# Patient Record
Sex: MALE
[De-identification: names, ages, dates, MRNs, and addresses within clinical notes are randomized per-mention and may not be internally consistent; named-entity substitution may affect disease eponyms.]

---

## 2017-11-02 ENCOUNTER — TRANSCRIPTION ENCOUNTER (OUTPATIENT)
Age: 57
End: 2017-11-02

## 2017-11-03 ENCOUNTER — INPATIENT (INPATIENT)
Facility: HOSPITAL | Age: 57
LOS: 2 days | Discharge: PSYCHIATRIC OR SUBSTANCE USE FACILITY | DRG: 581 | End: 2017-11-06
Attending: SURGERY
Payer: COMMERCIAL

## 2017-11-03 VITALS
SYSTOLIC BLOOD PRESSURE: 131 MMHG | OXYGEN SATURATION: 100 % | RESPIRATION RATE: 13 BRPM | TEMPERATURE: 99 F | DIASTOLIC BLOOD PRESSURE: 68 MMHG | HEART RATE: 50 BPM

## 2017-11-03 DIAGNOSIS — S11.91XA LACERATION WITHOUT FOREIGN BODY OF UNSPECIFIED PART OF NECK, INITIAL ENCOUNTER: ICD-10-CM

## 2017-11-03 LAB
ABO RH CONFIRMATION: SIGNIFICANT CHANGE UP
ALBUMIN SERPL ELPH-MCNC: 3.5 G/DL — SIGNIFICANT CHANGE UP (ref 3.3–5.2)
ALP SERPL-CCNC: 58 U/L — SIGNIFICANT CHANGE UP (ref 40–120)
ALT FLD-CCNC: 21 U/L — SIGNIFICANT CHANGE UP
ANION GAP SERPL CALC-SCNC: 16 MMOL/L — SIGNIFICANT CHANGE UP (ref 5–17)
APTT BLD: 19.1 SEC — LOW (ref 27.5–37.4)
AST SERPL-CCNC: 19 U/L — SIGNIFICANT CHANGE UP
BILIRUB SERPL-MCNC: 0.5 MG/DL — SIGNIFICANT CHANGE UP (ref 0.4–2)
BLD GP AB SCN SERPL QL: SIGNIFICANT CHANGE UP
BLD GP AB SCN SERPL QL: SIGNIFICANT CHANGE UP
BUN SERPL-MCNC: 18 MG/DL — SIGNIFICANT CHANGE UP (ref 8–20)
CALCIUM SERPL-MCNC: 8.3 MG/DL — LOW (ref 8.6–10.2)
CHLORIDE SERPL-SCNC: 102 MMOL/L — SIGNIFICANT CHANGE UP (ref 98–107)
CO2 SERPL-SCNC: 21 MMOL/L — LOW (ref 22–29)
CREAT SERPL-MCNC: 1.09 MG/DL — SIGNIFICANT CHANGE UP (ref 0.5–1.3)
ETHANOL SERPL-MCNC: <10 MG/DL — SIGNIFICANT CHANGE UP
GLUCOSE SERPL-MCNC: 214 MG/DL — HIGH (ref 70–115)
HCT VFR BLD CALC: 35 % — LOW (ref 42–52)
HGB BLD-MCNC: 12.3 G/DL — LOW (ref 14–18)
INR BLD: 1.06 RATIO — SIGNIFICANT CHANGE UP (ref 0.88–1.16)
LYMPHOCYTES # BLD AUTO: 7 % — LOW (ref 20–55)
MAGNESIUM SERPL-MCNC: 2.3 MG/DL — SIGNIFICANT CHANGE UP (ref 1.6–2.6)
MCHC RBC-ENTMCNC: 32.7 PG — HIGH (ref 27–31)
MCHC RBC-ENTMCNC: 35.1 G/DL — SIGNIFICANT CHANGE UP (ref 32–36)
MCV RBC AUTO: 93.1 FL — SIGNIFICANT CHANGE UP (ref 80–94)
MONOCYTES NFR BLD AUTO: 4 % — SIGNIFICANT CHANGE UP (ref 3–10)
NEUTROPHILS NFR BLD AUTO: 86 % — HIGH (ref 37–73)
NEUTS BAND # BLD: 3 % — SIGNIFICANT CHANGE UP (ref 0–8)
PHOSPHATE SERPL-MCNC: 4.1 MG/DL — SIGNIFICANT CHANGE UP (ref 2.4–4.7)
PLAT MORPH BLD: NORMAL — SIGNIFICANT CHANGE UP
PLATELET # BLD AUTO: 318 K/UL — SIGNIFICANT CHANGE UP (ref 150–400)
POTASSIUM SERPL-MCNC: 4.6 MMOL/L — SIGNIFICANT CHANGE UP (ref 3.5–5.3)
POTASSIUM SERPL-SCNC: 4.6 MMOL/L — SIGNIFICANT CHANGE UP (ref 3.5–5.3)
PROT SERPL-MCNC: 5.9 G/DL — LOW (ref 6.6–8.7)
PROTHROM AB SERPL-ACNC: 11.7 SEC — SIGNIFICANT CHANGE UP (ref 9.8–12.7)
RBC # BLD: 3.76 M/UL — LOW (ref 4.6–6.2)
RBC # FLD: 12.2 % — SIGNIFICANT CHANGE UP (ref 11–15.6)
RBC BLD AUTO: NORMAL — SIGNIFICANT CHANGE UP
SODIUM SERPL-SCNC: 139 MMOL/L — SIGNIFICANT CHANGE UP (ref 135–145)
TYPE + AB SCN PNL BLD: SIGNIFICANT CHANGE UP
TYPE + AB SCN PNL BLD: SIGNIFICANT CHANGE UP
WBC # BLD: 25.7 K/UL — HIGH (ref 4.8–10.8)
WBC # FLD AUTO: 25.7 K/UL — HIGH (ref 4.8–10.8)

## 2017-11-03 PROCEDURE — 70498 CT ANGIOGRAPHY NECK: CPT | Mod: 26

## 2017-11-03 PROCEDURE — 31622 DX BRONCHOSCOPE/WASH: CPT

## 2017-11-03 PROCEDURE — 71010: CPT | Mod: 26

## 2017-11-03 PROCEDURE — 12001 RPR S/N/AX/GEN/TRNK 2.5CM/<: CPT | Mod: 59

## 2017-11-03 PROCEDURE — 99285 EMERGENCY DEPT VISIT HI MDM: CPT

## 2017-11-03 PROCEDURE — 43235 EGD DIAGNOSTIC BRUSH WASH: CPT

## 2017-11-03 PROCEDURE — 12042 INTMD RPR N-HF/GENIT2.6-7.5: CPT | Mod: 59

## 2017-11-03 RX ORDER — FENTANYL CITRATE 50 UG/ML
50 INJECTION INTRAVENOUS
Refills: 0 | Status: DISCONTINUED | OUTPATIENT
Start: 2017-11-03 | End: 2017-11-04

## 2017-11-03 RX ORDER — TETANUS AND DIPHTHERIA TOXOIDS ADSORBED 2; 2 [LF]/.5ML; [LF]/.5ML
0.5 INJECTION INTRAMUSCULAR ONCE
Refills: 0 | Status: DISCONTINUED | OUTPATIENT
Start: 2017-11-03 | End: 2017-11-04

## 2017-11-03 RX ORDER — FENTANYL CITRATE 50 UG/ML
25 INJECTION INTRAVENOUS
Refills: 0 | Status: DISCONTINUED | OUTPATIENT
Start: 2017-11-03 | End: 2017-11-04

## 2017-11-03 RX ORDER — SODIUM CHLORIDE 9 MG/ML
1000 INJECTION, SOLUTION INTRAVENOUS
Refills: 0 | Status: DISCONTINUED | OUTPATIENT
Start: 2017-11-03 | End: 2017-11-04

## 2017-11-03 RX ORDER — SODIUM CHLORIDE 9 MG/ML
1000 INJECTION, SOLUTION INTRAVENOUS
Refills: 0 | Status: DISCONTINUED | OUTPATIENT
Start: 2017-11-03 | End: 2017-11-05

## 2017-11-03 RX ORDER — ONDANSETRON 8 MG/1
4 TABLET, FILM COATED ORAL ONCE
Refills: 0 | Status: DISCONTINUED | OUTPATIENT
Start: 2017-11-03 | End: 2017-11-04

## 2017-11-03 RX ORDER — ONDANSETRON 8 MG/1
4 TABLET, FILM COATED ORAL EVERY 6 HOURS
Refills: 0 | Status: DISCONTINUED | OUTPATIENT
Start: 2017-11-03 | End: 2017-11-06

## 2017-11-03 RX ORDER — ENOXAPARIN SODIUM 100 MG/ML
30 INJECTION SUBCUTANEOUS EVERY 12 HOURS
Refills: 0 | Status: DISCONTINUED | OUTPATIENT
Start: 2017-11-03 | End: 2017-11-06

## 2017-11-03 RX ORDER — HYDROMORPHONE HYDROCHLORIDE 2 MG/ML
1 INJECTION INTRAMUSCULAR; INTRAVENOUS; SUBCUTANEOUS EVERY 4 HOURS
Refills: 0 | Status: DISCONTINUED | OUTPATIENT
Start: 2017-11-03 | End: 2017-11-05

## 2017-11-03 NOTE — ED PROVIDER NOTE - SKIN WOUND TYPE
10cm deep laceration across Left lateral aspect of neck. Laceration extends down to the trachea. No active bleeding/expanding hematoma./LACERATION(S)
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
## 2017-11-03 NOTE — BRIEF OPERATIVE NOTE - PROCEDURE
<<-----Click on this checkbox to enter Procedure Direct laryngoscopy with bronchoscopy and esophagoscopy in patient younger than 8 years of age  11/03/2017    Active  [id number]  Neck surgery  11/03/2017  Exploration of Zone 2 neck wound and complex closure of wound  Active  [id number]  Exploration and closure of wound of left upper extremity  11/03/2017  left antecubital fossa and left wrist  Active  [id number]
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
## 2017-11-03 NOTE — BRIEF OPERATIVE NOTE - COMMENTS
left upper extremity vascular checks and neck wound checks for vascular hard or soft signs to be monitored post op. one to one monitoring

## 2017-11-03 NOTE — H&P ADULT - NSHPPHYSICALEXAM_GEN_ALL_CORE
HEENT: Normocephalic, atraumatic, RODRI, EOM wnl, no otorrhea or hemotympanum b/l, no epistaxis or d/c b/l nares, no craniofacial bony pathology or tenderness b/l  Neck: 10 cm laceration to neck midline with no active bleeding  Cspine/thoracolumbrosacral spine: no gross bony pathology or tenderness to exam  Cardiovascular: S1S2 Present  Chest: no gross rib pathology or tenderness to exam. No sternal pathology or tenderness to exam. No crepitus, no ecchymosis, no hematoma. No penetrating thorcoabdominal trauma  Respiratory: no wheezes, rales or rhonchi to exam  ABD: soft, nontender, non distended, no rebound, no gaurding, no rigidity, no skin changes to exam. No pelvic instability to exam, no skin changes  Musculoskeletal: 5cm laceration to left antecubital with active bleeding, 3cm laceration to dorsal wrist  Skin: no lesions or rashes to exam
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
## 2017-11-04 LAB
ANION GAP SERPL CALC-SCNC: 9 MMOL/L — SIGNIFICANT CHANGE UP (ref 5–17)
BASOPHILS # BLD AUTO: 0 K/UL — SIGNIFICANT CHANGE UP (ref 0–0.2)
BASOPHILS # BLD AUTO: 0 K/UL — SIGNIFICANT CHANGE UP (ref 0–0.2)
BASOPHILS NFR BLD AUTO: 0.1 % — SIGNIFICANT CHANGE UP (ref 0–2)
BASOPHILS NFR BLD AUTO: 0.1 % — SIGNIFICANT CHANGE UP (ref 0–2)
BUN SERPL-MCNC: 12 MG/DL — SIGNIFICANT CHANGE UP (ref 8–20)
CALCIUM SERPL-MCNC: 8.4 MG/DL — LOW (ref 8.6–10.2)
CHLORIDE SERPL-SCNC: 107 MMOL/L — SIGNIFICANT CHANGE UP (ref 98–107)
CO2 SERPL-SCNC: 24 MMOL/L — SIGNIFICANT CHANGE UP (ref 22–29)
CREAT SERPL-MCNC: 0.86 MG/DL — SIGNIFICANT CHANGE UP (ref 0.5–1.3)
EOSINOPHIL # BLD AUTO: 0 K/UL — SIGNIFICANT CHANGE UP (ref 0–0.5)
EOSINOPHIL # BLD AUTO: 0 K/UL — SIGNIFICANT CHANGE UP (ref 0–0.5)
EOSINOPHIL NFR BLD AUTO: 0 % — SIGNIFICANT CHANGE UP (ref 0–5)
EOSINOPHIL NFR BLD AUTO: 0 % — SIGNIFICANT CHANGE UP (ref 0–5)
GLUCOSE SERPL-MCNC: 139 MG/DL — HIGH (ref 70–115)
HCT VFR BLD CALC: 32.8 % — LOW (ref 42–52)
HCT VFR BLD CALC: 36.6 % — LOW (ref 42–52)
HGB BLD-MCNC: 11.7 G/DL — LOW (ref 14–18)
HGB BLD-MCNC: 12.9 G/DL — LOW (ref 14–18)
LYMPHOCYTES # BLD AUTO: 0.7 K/UL — LOW (ref 1–4.8)
LYMPHOCYTES # BLD AUTO: 0.9 K/UL — LOW (ref 1–4.8)
LYMPHOCYTES # BLD AUTO: 3.7 % — LOW (ref 20–55)
LYMPHOCYTES # BLD AUTO: 6.4 % — LOW (ref 20–55)
MAGNESIUM SERPL-MCNC: 1.8 MG/DL — SIGNIFICANT CHANGE UP (ref 1.6–2.6)
MCHC RBC-ENTMCNC: 32 PG — HIGH (ref 27–31)
MCHC RBC-ENTMCNC: 32.1 PG — HIGH (ref 27–31)
MCHC RBC-ENTMCNC: 35.2 G/DL — SIGNIFICANT CHANGE UP (ref 32–36)
MCHC RBC-ENTMCNC: 35.7 G/DL — SIGNIFICANT CHANGE UP (ref 32–36)
MCV RBC AUTO: 90.1 FL — SIGNIFICANT CHANGE UP (ref 80–94)
MCV RBC AUTO: 90.8 FL — SIGNIFICANT CHANGE UP (ref 80–94)
MONOCYTES # BLD AUTO: 0.4 K/UL — SIGNIFICANT CHANGE UP (ref 0–0.8)
MONOCYTES # BLD AUTO: 0.5 K/UL — SIGNIFICANT CHANGE UP (ref 0–0.8)
MONOCYTES NFR BLD AUTO: 2.6 % — LOW (ref 3–10)
MONOCYTES NFR BLD AUTO: 2.8 % — LOW (ref 3–10)
NEUTROPHILS # BLD AUTO: 12.2 K/UL — HIGH (ref 1.8–8)
NEUTROPHILS # BLD AUTO: 16.9 K/UL — HIGH (ref 1.8–8)
NEUTROPHILS NFR BLD AUTO: 90.3 % — HIGH (ref 37–73)
NEUTROPHILS NFR BLD AUTO: 93 % — HIGH (ref 37–73)
PHOSPHATE SERPL-MCNC: 3.4 MG/DL — SIGNIFICANT CHANGE UP (ref 2.4–4.7)
PLAT MORPH BLD: NORMAL — SIGNIFICANT CHANGE UP
PLATELET # BLD AUTO: 164 K/UL — SIGNIFICANT CHANGE UP (ref 150–400)
PLATELET # BLD AUTO: 179 K/UL — SIGNIFICANT CHANGE UP (ref 150–400)
POTASSIUM SERPL-MCNC: 4.6 MMOL/L — SIGNIFICANT CHANGE UP (ref 3.5–5.3)
POTASSIUM SERPL-SCNC: 4.6 MMOL/L — SIGNIFICANT CHANGE UP (ref 3.5–5.3)
RBC # BLD: 3.64 M/UL — LOW (ref 4.6–6.2)
RBC # BLD: 4.03 M/UL — LOW (ref 4.6–6.2)
RBC # FLD: 12.9 % — SIGNIFICANT CHANGE UP (ref 11–15.6)
RBC # FLD: 12.9 % — SIGNIFICANT CHANGE UP (ref 11–15.6)
RBC BLD AUTO: NORMAL — SIGNIFICANT CHANGE UP
SODIUM SERPL-SCNC: 140 MMOL/L — SIGNIFICANT CHANGE UP (ref 135–145)
WBC # BLD: 13.4 K/UL — HIGH (ref 4.8–10.8)
WBC # BLD: 18.2 K/UL — HIGH (ref 4.8–10.8)
WBC # FLD AUTO: 13.4 K/UL — HIGH (ref 4.8–10.8)
WBC # FLD AUTO: 18.2 K/UL — HIGH (ref 4.8–10.8)

## 2017-11-04 PROCEDURE — 74220 X-RAY XM ESOPHAGUS 1CNTRST: CPT | Mod: 26

## 2017-11-04 RX ORDER — MAGNESIUM SULFATE 500 MG/ML
2 VIAL (ML) INJECTION ONCE
Refills: 0 | Status: COMPLETED | OUTPATIENT
Start: 2017-11-04 | End: 2017-11-04

## 2017-11-04 RX ORDER — TETANUS TOXOID, REDUCED DIPHTHERIA TOXOID AND ACELLULAR PERTUSSIS VACCINE, ADSORBED 5; 2.5; 8; 8; 2.5 [IU]/.5ML; [IU]/.5ML; UG/.5ML; UG/.5ML; UG/.5ML
0.5 SUSPENSION INTRAMUSCULAR ONCE
Refills: 0 | Status: COMPLETED | OUTPATIENT
Start: 2017-11-04 | End: 2017-11-05

## 2017-11-04 RX ADMIN — HYDROMORPHONE HYDROCHLORIDE 1 MILLIGRAM(S): 2 INJECTION INTRAMUSCULAR; INTRAVENOUS; SUBCUTANEOUS at 14:19

## 2017-11-04 RX ADMIN — HYDROMORPHONE HYDROCHLORIDE 1 MILLIGRAM(S): 2 INJECTION INTRAMUSCULAR; INTRAVENOUS; SUBCUTANEOUS at 08:52

## 2017-11-04 RX ADMIN — HYDROMORPHONE HYDROCHLORIDE 1 MILLIGRAM(S): 2 INJECTION INTRAMUSCULAR; INTRAVENOUS; SUBCUTANEOUS at 18:57

## 2017-11-04 RX ADMIN — ENOXAPARIN SODIUM 30 MILLIGRAM(S): 100 INJECTION SUBCUTANEOUS at 18:23

## 2017-11-04 RX ADMIN — HYDROMORPHONE HYDROCHLORIDE 1 MILLIGRAM(S): 2 INJECTION INTRAMUSCULAR; INTRAVENOUS; SUBCUTANEOUS at 09:13

## 2017-11-04 RX ADMIN — ENOXAPARIN SODIUM 30 MILLIGRAM(S): 100 INJECTION SUBCUTANEOUS at 05:14

## 2017-11-04 RX ADMIN — Medication 50 GRAM(S): at 18:21

## 2017-11-04 RX ADMIN — HYDROMORPHONE HYDROCHLORIDE 1 MILLIGRAM(S): 2 INJECTION INTRAMUSCULAR; INTRAVENOUS; SUBCUTANEOUS at 19:46

## 2017-11-04 RX ADMIN — SODIUM CHLORIDE 100 MILLILITER(S): 9 INJECTION, SOLUTION INTRAVENOUS at 01:00

## 2017-11-04 RX ADMIN — HYDROMORPHONE HYDROCHLORIDE 1 MILLIGRAM(S): 2 INJECTION INTRAMUSCULAR; INTRAVENOUS; SUBCUTANEOUS at 14:08

## 2017-11-04 RX ADMIN — HYDROMORPHONE HYDROCHLORIDE 1 MILLIGRAM(S): 2 INJECTION INTRAMUSCULAR; INTRAVENOUS; SUBCUTANEOUS at 02:24

## 2017-11-04 RX ADMIN — HYDROMORPHONE HYDROCHLORIDE 1 MILLIGRAM(S): 2 INJECTION INTRAMUSCULAR; INTRAVENOUS; SUBCUTANEOUS at 02:39
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
## 2017-11-04 NOTE — PROGRESS NOTE ADULT - SUBJECTIVE AND OBJECTIVE BOX
INTERVAL HPI/OVERNIGHT EVENTS:    SUBJECTIVE:  Pt seen and examined at bedside. No acute events overnight. Denies new onset hoarseness, or changes in voice. Denies numbness, tingling or weakness of the LUE. He remains on 1:1 with no current ideas of suicide.     MEDICATIONS  (STANDING):  diphtheria/tetanus/pertussis (acellular) Vaccine (ADAcel) 0.5 milliLiter(s) IntraMuscular once  enoxaparin Injectable 30 milliGRAM(s) SubCutaneous every 12 hours  lactated ringers. 1000 milliLiter(s) (100 mL/Hr) IV Continuous <Continuous>    MEDICATIONS  (PRN):  HYDROmorphone  Injectable 1 milliGRAM(s) IV Push every 4 hours PRN Severe Pain  ondansetron Injectable 4 milliGRAM(s) IV Push every 6 hours PRN Nausea      Vital Signs Last 24 Hrs  T(C): 36.7 (04 Nov 2017 16:50), Max: 37.6 (04 Nov 2017 10:18)  T(F): 98.1 (04 Nov 2017 16:50), Max: 99.6 (04 Nov 2017 10:18)  HR: 60 (04 Nov 2017 16:50) (50 - 95)  BP: 98/53 (04 Nov 2017 16:50) (98/53 - 153/48)  BP(mean): --  RR: 18 (04 Nov 2017 16:50) (11 - 21)  SpO2: 100% (04 Nov 2017 16:50) (95% - 100%)    PE  Gen: No acute distress  Neck: Wound is clean, dry and intact. Non edematous. DAKOTAH drain to neck has 20ml serosanguinous.  Pulm: CTABL.  CV: S1/S2  Abd: Soft, Nontender  Ext: wounds to LUE are clean, dry and intact.   Vasc: Radial pulse 2+ bilaterally  Neuro:  strength 5/5 bilateral. Moves all extremities CN nerves 2-12 intact.      I&O's Detail    03 Nov 2017 07:01  -  04 Nov 2017 07:00  --------------------------------------------------------  IN:    lactated ringers.: 500 mL    lactated ringers.: 700 mL  Total IN: 1200 mL    OUT:    Bulb: 5 mL    Voided: 1500 mL  Total OUT: 1505 mL    Total NET: -305 mL      04 Nov 2017 08:01  -  04 Nov 2017 20:04  --------------------------------------------------------  IN:  Total IN: 0 mL    OUT:    Bulb: 20 mL    Voided: 400 mL  Total OUT: 420 mL    Total NET: -420 mL          LABS:                        11.7   13.4  )-----------( 179      ( 04 Nov 2017 05:28 )             32.8     11-04    140  |  107  |  12.0  ----------------------------<  139<H>  4.6   |  24.0  |  0.86    Ca    8.4<L>      04 Nov 2017 05:27  Phos  3.4     11-04  Mg     1.8     11-04    TPro  5.9<L>  /  Alb  3.5  /  TBili  0.5  /  DBili  x   /  AST  19  /  ALT  21  /  AlkPhos  58  11-03    PT/INR - ( 03 Nov 2017 17:39 )   PT: 11.7 sec;   INR: 1.06 ratio         PTT - ( 03 Nov 2017 17:39 )  PTT:19.1 sec      RADIOLOGY & ADDITIONAL STUDIES:

## 2017-11-05 DIAGNOSIS — F31.9 BIPOLAR DISORDER, UNSPECIFIED: ICD-10-CM

## 2017-11-05 DIAGNOSIS — S11.91XA LACERATION WITHOUT FOREIGN BODY OF UNSPECIFIED PART OF NECK, INITIAL ENCOUNTER: ICD-10-CM

## 2017-11-05 DIAGNOSIS — F39 UNSPECIFIED MOOD [AFFECTIVE] DISORDER: ICD-10-CM

## 2017-11-05 LAB
ANION GAP SERPL CALC-SCNC: 8 MMOL/L — SIGNIFICANT CHANGE UP (ref 5–17)
BASOPHILS # BLD AUTO: 0 K/UL — SIGNIFICANT CHANGE UP (ref 0–0.2)
BASOPHILS NFR BLD AUTO: 0.5 % — SIGNIFICANT CHANGE UP (ref 0–2)
BUN SERPL-MCNC: 9 MG/DL — SIGNIFICANT CHANGE UP (ref 8–20)
CALCIUM SERPL-MCNC: 8.1 MG/DL — LOW (ref 8.6–10.2)
CHLORIDE SERPL-SCNC: 106 MMOL/L — SIGNIFICANT CHANGE UP (ref 98–107)
CO2 SERPL-SCNC: 30 MMOL/L — HIGH (ref 22–29)
CREAT SERPL-MCNC: 0.82 MG/DL — SIGNIFICANT CHANGE UP (ref 0.5–1.3)
EOSINOPHIL # BLD AUTO: 0.1 K/UL — SIGNIFICANT CHANGE UP (ref 0–0.5)
EOSINOPHIL NFR BLD AUTO: 1.1 % — SIGNIFICANT CHANGE UP (ref 0–5)
GLUCOSE SERPL-MCNC: 94 MG/DL — SIGNIFICANT CHANGE UP (ref 70–115)
HCT VFR BLD CALC: 29.8 % — LOW (ref 42–52)
HGB BLD-MCNC: 10.4 G/DL — LOW (ref 14–18)
LYMPHOCYTES # BLD AUTO: 2.2 K/UL — SIGNIFICANT CHANGE UP (ref 1–4.8)
LYMPHOCYTES # BLD AUTO: 25.5 % — SIGNIFICANT CHANGE UP (ref 20–55)
MAGNESIUM SERPL-MCNC: 2.2 MG/DL — SIGNIFICANT CHANGE UP (ref 1.6–2.6)
MCHC RBC-ENTMCNC: 32.1 PG — HIGH (ref 27–31)
MCHC RBC-ENTMCNC: 34.9 G/DL — SIGNIFICANT CHANGE UP (ref 32–36)
MCV RBC AUTO: 92 FL — SIGNIFICANT CHANGE UP (ref 80–94)
MONOCYTES # BLD AUTO: 0.8 K/UL — SIGNIFICANT CHANGE UP (ref 0–0.8)
MONOCYTES NFR BLD AUTO: 8.8 % — SIGNIFICANT CHANGE UP (ref 3–10)
NEUTROPHILS # BLD AUTO: 5.4 K/UL — SIGNIFICANT CHANGE UP (ref 1.8–8)
NEUTROPHILS NFR BLD AUTO: 63.9 % — SIGNIFICANT CHANGE UP (ref 37–73)
PHOSPHATE SERPL-MCNC: 1.9 MG/DL — LOW (ref 2.4–4.7)
PLATELET # BLD AUTO: 145 K/UL — LOW (ref 150–400)
POTASSIUM SERPL-MCNC: 3.5 MMOL/L — SIGNIFICANT CHANGE UP (ref 3.5–5.3)
POTASSIUM SERPL-SCNC: 3.5 MMOL/L — SIGNIFICANT CHANGE UP (ref 3.5–5.3)
RBC # BLD: 3.24 M/UL — LOW (ref 4.6–6.2)
RBC # FLD: 13 % — SIGNIFICANT CHANGE UP (ref 11–15.6)
SODIUM SERPL-SCNC: 144 MMOL/L — SIGNIFICANT CHANGE UP (ref 135–145)
WBC # BLD: 8.5 K/UL — SIGNIFICANT CHANGE UP (ref 4.8–10.8)
WBC # FLD AUTO: 8.5 K/UL — SIGNIFICANT CHANGE UP (ref 4.8–10.8)

## 2017-11-05 PROCEDURE — 99253 IP/OBS CNSLTJ NEW/EST LOW 45: CPT

## 2017-11-05 RX ORDER — ACETAMINOPHEN 500 MG
650 TABLET ORAL EVERY 6 HOURS
Refills: 0 | Status: DISCONTINUED | OUTPATIENT
Start: 2017-11-05 | End: 2017-11-06

## 2017-11-05 RX ORDER — POTASSIUM CHLORIDE 20 MEQ
40 PACKET (EA) ORAL ONCE
Refills: 0 | Status: COMPLETED | OUTPATIENT
Start: 2017-11-05 | End: 2017-11-05

## 2017-11-05 RX ORDER — QUETIAPINE FUMARATE 200 MG/1
50 TABLET, FILM COATED ORAL AT BEDTIME
Refills: 0 | Status: DISCONTINUED | OUTPATIENT
Start: 2017-11-05 | End: 2017-11-06

## 2017-11-05 RX ADMIN — QUETIAPINE FUMARATE 50 MILLIGRAM(S): 200 TABLET, FILM COATED ORAL at 21:13

## 2017-11-05 RX ADMIN — Medication 40 MILLIEQUIVALENT(S): at 14:30

## 2017-11-05 RX ADMIN — TETANUS TOXOID, REDUCED DIPHTHERIA TOXOID AND ACELLULAR PERTUSSIS VACCINE, ADSORBED 0.5 MILLILITER(S): 5; 2.5; 8; 8; 2.5 SUSPENSION INTRAMUSCULAR at 17:59

## 2017-11-05 RX ADMIN — Medication 650 MILLIGRAM(S): at 05:32

## 2017-11-05 RX ADMIN — ENOXAPARIN SODIUM 30 MILLIGRAM(S): 100 INJECTION SUBCUTANEOUS at 04:52

## 2017-11-05 RX ADMIN — Medication 650 MILLIGRAM(S): at 20:27

## 2017-11-05 RX ADMIN — Medication 650 MILLIGRAM(S): at 04:50

## 2017-11-05 RX ADMIN — Medication 63.75 MILLIMOLE(S): at 14:31

## 2017-11-05 RX ADMIN — Medication 650 MILLIGRAM(S): at 19:31

## 2017-11-05 RX ADMIN — ENOXAPARIN SODIUM 30 MILLIGRAM(S): 100 INJECTION SUBCUTANEOUS at 17:59
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
## 2017-11-05 NOTE — PROGRESS NOTE ADULT - PROBLEM SELECTOR PLAN 1
- Spoke with behavioral health service again this morning to confirm psych consult was rcvd - they stated they had the consult and patient would be seen. As of sunday afternoon, awaiting note with evaluation and recommendations  - Regular diet as tolerated, d/c'ed IVF  - If drain OP remains minimal and pt continues to tolerated regular diet - will d/c DAKOTAH drain tomorrow  - Continue pain control with current regimen, PRN  - Trauma team will continue to assess wounds daily and perform local wound care  - Encourage ambulation as tolerated  - DVT ppx with lovenox  - Incentive spirometer for pulmonary toileting - Spoke with behavioral health service again this morning to confirm psych consult was rcvd - they stated they had the consult and patient would be seen. Gave service the patient's information again. As of sunday afternoon, awaiting note with evaluation and recommendations  - Continue 1:1 constant observation  - Regular diet as tolerated, d/c'ed IVF  - If drain OP remains minimal and pt continues to tolerated regular diet - will d/c DAKOTAH drain tomorrow  - Continue pain control with current regimen, PRN  - Trauma team will continue to assess wounds daily and perform local wound care  - Encourage ambulation as tolerated  - DVT ppx with lovenox  - Incentive spirometer for pulmonary toileting - Psych input appreciated - dilaudid d/c'ed and started on Seroquel   - Per psych patient is likely to go to [facility] upon discharge - needs further psychatric assessment prior to d/c  - Continue 1:1 constant observation  - Regular diet as tolerated, d/c'ed IVF  - If drain OP remains minimal and pt continues to tolerated regular diet - will d/c DAKOTAH drain tomorrow  - Continue pain control with current regimen, PRN  - Trauma team will continue to assess wounds daily and perform local wound care  - Encourage ambulation as tolerated  - DVT ppx with lovenox  - Incentive spirometer for pulmonary toileting
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
## 2017-11-05 NOTE — PROGRESS NOTE ADULT - SUBJECTIVE AND OBJECTIVE BOX
HPI/OVERNIGHT EVENTS: Patient seen and examined at bedside this morning - late chart entry. Patient reported feeling well. He states pain is minimal and well controlled with medication. He denies feelings of difficulty breathing, difficulty swallowing, difficulty talking, or feelings of edema to neck. He was able to tolerate clear liquid diet this AM without difficulty, after esophagram from yesterday was normal. He expresses desire to speak with psychiatrist - reassured patient that service has been called and will be called again today to follow-up. He has been OOB ambulating and voiding independently. He denies feelings of numbness / tingling or oozing from wounds on RUE. Patient denies feelings of fever, chills, CP, or SOB.     MEDICATIONS  (STANDING):  diphtheria/tetanus/pertussis (acellular) Vaccine (ADAcel) 0.5 milliLiter(s) IntraMuscular once  enoxaparin Injectable 30 milliGRAM(s) SubCutaneous every 12 hours    MEDICATIONS  (PRN):  acetaminophen   Tablet. 650 milliGRAM(s) Oral every 6 hours PRN Mild Pain (1 - 3)  HYDROmorphone  Injectable 1 milliGRAM(s) IV Push every 4 hours PRN Severe Pain  ondansetron Injectable 4 milliGRAM(s) IV Push every 6 hours PRN Nausea      Vital Signs Last 24 Hrs  T(C): 36.7 (05 Nov 2017 15:28), Max: 36.7 (04 Nov 2017 16:50)  T(F): 98.1 (05 Nov 2017 15:28), Max: 98.1 (04 Nov 2017 16:50)  HR: 67 (05 Nov 2017 15:28) (60 - 67)  BP: 115/65 (05 Nov 2017 15:28) (91/53 - 115/65)  BP(mean): --  RR: 18 (05 Nov 2017 15:28) (17 - 19)  SpO2: 99% (05 Nov 2017 15:28) (98% - 100%)    Constitutional: patient resting comfortably in bed, on 1:1, appears calm with family at bedside, in no acute distress  HEENT: EOMI / PERRL b/l, nasal cannula in place  Neck: Neck dressing removed revealing horizontal laceration with no active drainage or surrounding erythema. Drain remains in place to right side of neck with minimal serosanguenous output. Drain stripped and sterile dressing replaced.   Respiratory: respirations are unlabored, no accessory muscle use, no conversational dyspnea  Cardiovascular: regular rate & rhythm  Gastrointestinal: abdomen soft, non-tender, non-distended, no rebound tenderness / guarding  Extremities: LUE with forearm and wrist wounds covered with steri-strips - no active bleeding. RUE remains neurovascularly intact  Neurological: GCS: 15 (4/5/6). A&O x 3; no gross sensory / motor / coordination deficits      I&O's Detail    04 Nov 2017 08:01  -  05 Nov 2017 07:00  --------------------------------------------------------  IN:    lactated ringers.: 1100 mL  Total IN: 1100 mL    OUT:    Bulb: 35 mL    Voided: 400 mL  Total OUT: 435 mL    Total NET: 665 mL          LABS:                        10.4   8.5   )-----------( 145      ( 05 Nov 2017 05:37 )             29.8     11-05    144  |  106  |  9.0  ----------------------------<  94  3.5   |  30.0<H>  |  0.82    Ca    8.1<L>      05 Nov 2017 05:38  Phos  1.9     11-05  Mg     2.2     11-05    TPro  5.9<L>  /  Alb  3.5  /  TBili  0.5  /  DBili  x   /  AST  19  /  ALT  21  /  AlkPhos  58  11-03    PT/INR - ( 03 Nov 2017 17:39 )   PT: 11.7 sec;   INR: 1.06 ratio         PTT - ( 03 Nov 2017 17:39 )  PTT:19.1 sec HPI/OVERNIGHT EVENTS: Patient seen and examined at bedside this morning - late chart entry. Patient reported feeling well. He states pain is minimal and well controlled with medication. He denies feelings of difficulty breathing, difficulty swallowing, difficulty talking, or feelings of neck swelling. He was able to tolerate clear liquid diet this AM without difficulty, after esophagram from yesterday was normal. He expresses desire to speak with psychiatrist - reassured patient that service has been called and will be called again today to follow-up. He has been OOB ambulating and voiding independently. He denies feelings of numbness / tingling or oozing from wounds on RUE. Patient denies feelings of fever, chills, CP, or SOB.     MEDICATIONS  (STANDING):  diphtheria/tetanus/pertussis (acellular) Vaccine (ADAcel) 0.5 milliLiter(s) IntraMuscular once  enoxaparin Injectable 30 milliGRAM(s) SubCutaneous every 12 hours    MEDICATIONS  (PRN):  acetaminophen   Tablet. 650 milliGRAM(s) Oral every 6 hours PRN Mild Pain (1 - 3)  HYDROmorphone  Injectable 1 milliGRAM(s) IV Push every 4 hours PRN Severe Pain  ondansetron Injectable 4 milliGRAM(s) IV Push every 6 hours PRN Nausea      Vital Signs Last 24 Hrs  T(C): 36.7 (05 Nov 2017 15:28), Max: 36.7 (04 Nov 2017 16:50)  T(F): 98.1 (05 Nov 2017 15:28), Max: 98.1 (04 Nov 2017 16:50)  HR: 67 (05 Nov 2017 15:28) (60 - 67)  BP: 115/65 (05 Nov 2017 15:28) (91/53 - 115/65)  BP(mean): --  RR: 18 (05 Nov 2017 15:28) (17 - 19)  SpO2: 99% (05 Nov 2017 15:28) (98% - 100%)    Constitutional: patient resting comfortably in bed, on 1:1, appears calm with family at bedside, in no acute distress  HEENT: EOMI / PERRL b/l, nasal cannula in place  Neck: Neck dressing removed revealing horizontal laceration with no active drainage or surrounding erythema. Drain remains in place to right side of neck with minimal serosanguenous output. Drain stripped and sterile dressing replaced.   Respiratory: respirations are unlabored, no accessory muscle use, no conversational dyspnea  Cardiovascular: regular rate & rhythm  Gastrointestinal: abdomen soft, non-tender, non-distended, no rebound tenderness / guarding  Extremities: LUE with forearm and wrist wounds covered with steri-strips - no active bleeding. RUE remains neurovascularly intact  Neurological: GCS: 15 (4/5/6). A&O x 3; no gross sensory / motor / coordination deficits      I&O's Detail    04 Nov 2017 08:01  -  05 Nov 2017 07:00  --------------------------------------------------------  IN:    lactated ringers.: 1100 mL  Total IN: 1100 mL    OUT:    Bulb: 35 mL    Voided: 400 mL  Total OUT: 435 mL    Total NET: 665 mL          LABS:                        10.4   8.5   )-----------( 145      ( 05 Nov 2017 05:37 )             29.8     11-05    144  |  106  |  9.0  ----------------------------<  94  3.5   |  30.0<H>  |  0.82    Ca    8.1<L>      05 Nov 2017 05:38  Phos  1.9     11-05  Mg     2.2     11-05    TPro  5.9<L>  /  Alb  3.5  /  TBili  0.5  /  DBili  x   /  AST  19  /  ALT  21  /  AlkPhos  58  11-03    PT/INR - ( 03 Nov 2017 17:39 )   PT: 11.7 sec;   INR: 1.06 ratio         PTT - ( 03 Nov 2017 17:39 )  PTT:19.1 sec

## 2017-11-06 ENCOUNTER — INPATIENT (INPATIENT)
Facility: HOSPITAL | Age: 57
LOS: 3 days | Discharge: HOME | End: 2017-11-10
Attending: PSYCHIATRY & NEUROLOGY | Admitting: PSYCHIATRY & NEUROLOGY
Payer: COMMERCIAL

## 2017-11-06 ENCOUNTER — TRANSCRIPTION ENCOUNTER (OUTPATIENT)
Age: 57
End: 2017-11-06

## 2017-11-06 VITALS
OXYGEN SATURATION: 99 % | RESPIRATION RATE: 18 BRPM | HEART RATE: 67 BPM | TEMPERATURE: 98 F | DIASTOLIC BLOOD PRESSURE: 67 MMHG | SYSTOLIC BLOOD PRESSURE: 114 MMHG

## 2017-11-06 VITALS — TEMPERATURE: 98 F | RESPIRATION RATE: 18 BRPM | HEIGHT: 72 IN | WEIGHT: 176.37 LBS

## 2017-11-06 DIAGNOSIS — F30.9 MANIC EPISODE, UNSPECIFIED: ICD-10-CM

## 2017-11-06 LAB
ANION GAP SERPL CALC-SCNC: 9 MMOL/L — SIGNIFICANT CHANGE UP (ref 5–17)
BASOPHILS # BLD AUTO: 0 K/UL — SIGNIFICANT CHANGE UP (ref 0–0.2)
BASOPHILS NFR BLD AUTO: 0.5 % — SIGNIFICANT CHANGE UP (ref 0–2)
BUN SERPL-MCNC: 9 MG/DL — SIGNIFICANT CHANGE UP (ref 8–20)
CALCIUM SERPL-MCNC: 8.4 MG/DL — LOW (ref 8.6–10.2)
CHLORIDE SERPL-SCNC: 109 MMOL/L — HIGH (ref 98–107)
CO2 SERPL-SCNC: 27 MMOL/L — SIGNIFICANT CHANGE UP (ref 22–29)
CREAT SERPL-MCNC: 0.75 MG/DL — SIGNIFICANT CHANGE UP (ref 0.5–1.3)
EOSINOPHIL # BLD AUTO: 0.2 K/UL — SIGNIFICANT CHANGE UP (ref 0–0.5)
EOSINOPHIL NFR BLD AUTO: 2.8 % — SIGNIFICANT CHANGE UP (ref 0–5)
GLUCOSE SERPL-MCNC: 97 MG/DL — SIGNIFICANT CHANGE UP (ref 70–115)
HCT VFR BLD CALC: 28.8 % — LOW (ref 42–52)
HGB BLD-MCNC: 10.1 G/DL — LOW (ref 14–18)
LYMPHOCYTES # BLD AUTO: 2.1 K/UL — SIGNIFICANT CHANGE UP (ref 1–4.8)
LYMPHOCYTES # BLD AUTO: 34 % — SIGNIFICANT CHANGE UP (ref 20–55)
MAGNESIUM SERPL-MCNC: 2.3 MG/DL — SIGNIFICANT CHANGE UP (ref 1.6–2.6)
MCHC RBC-ENTMCNC: 32 PG — HIGH (ref 27–31)
MCHC RBC-ENTMCNC: 35.1 G/DL — SIGNIFICANT CHANGE UP (ref 32–36)
MCV RBC AUTO: 91.1 FL — SIGNIFICANT CHANGE UP (ref 80–94)
MONOCYTES # BLD AUTO: 0.5 K/UL — SIGNIFICANT CHANGE UP (ref 0–0.8)
MONOCYTES NFR BLD AUTO: 8.2 % — SIGNIFICANT CHANGE UP (ref 3–10)
NEUTROPHILS # BLD AUTO: 3.3 K/UL — SIGNIFICANT CHANGE UP (ref 1.8–8)
NEUTROPHILS NFR BLD AUTO: 54.2 % — SIGNIFICANT CHANGE UP (ref 37–73)
PHOSPHATE SERPL-MCNC: 2.9 MG/DL — SIGNIFICANT CHANGE UP (ref 2.4–4.7)
PLATELET # BLD AUTO: 148 K/UL — LOW (ref 150–400)
POTASSIUM SERPL-MCNC: 3.8 MMOL/L — SIGNIFICANT CHANGE UP (ref 3.5–5.3)
POTASSIUM SERPL-SCNC: 3.8 MMOL/L — SIGNIFICANT CHANGE UP (ref 3.5–5.3)
RBC # BLD: 3.16 M/UL — LOW (ref 4.6–6.2)
RBC # FLD: 12.7 % — SIGNIFICANT CHANGE UP (ref 11–15.6)
SODIUM SERPL-SCNC: 145 MMOL/L — SIGNIFICANT CHANGE UP (ref 135–145)
WBC # BLD: 6.1 K/UL — SIGNIFICANT CHANGE UP (ref 4.8–10.8)
WBC # FLD AUTO: 6.1 K/UL — SIGNIFICANT CHANGE UP (ref 4.8–10.8)

## 2017-11-06 PROCEDURE — 85610 PROTHROMBIN TIME: CPT

## 2017-11-06 PROCEDURE — 86850 RBC ANTIBODY SCREEN: CPT

## 2017-11-06 PROCEDURE — 74220 X-RAY XM ESOPHAGUS 1CNTRST: CPT

## 2017-11-06 PROCEDURE — 90715 TDAP VACCINE 7 YRS/> IM: CPT

## 2017-11-06 PROCEDURE — 99233 SBSQ HOSP IP/OBS HIGH 50: CPT

## 2017-11-06 PROCEDURE — 80053 COMPREHEN METABOLIC PANEL: CPT

## 2017-11-06 PROCEDURE — 93005 ELECTROCARDIOGRAM TRACING: CPT

## 2017-11-06 PROCEDURE — 99285 EMERGENCY DEPT VISIT HI MDM: CPT | Mod: 25

## 2017-11-06 PROCEDURE — 83735 ASSAY OF MAGNESIUM: CPT

## 2017-11-06 PROCEDURE — 36415 COLL VENOUS BLD VENIPUNCTURE: CPT

## 2017-11-06 PROCEDURE — 86900 BLOOD TYPING SEROLOGIC ABO: CPT

## 2017-11-06 PROCEDURE — 93010 ELECTROCARDIOGRAM REPORT: CPT

## 2017-11-06 PROCEDURE — 80048 BASIC METABOLIC PNL TOTAL CA: CPT

## 2017-11-06 PROCEDURE — 84100 ASSAY OF PHOSPHORUS: CPT

## 2017-11-06 PROCEDURE — 86920 COMPATIBILITY TEST SPIN: CPT

## 2017-11-06 PROCEDURE — 70498 CT ANGIOGRAPHY NECK: CPT

## 2017-11-06 PROCEDURE — 85027 COMPLETE CBC AUTOMATED: CPT

## 2017-11-06 PROCEDURE — 80307 DRUG TEST PRSMV CHEM ANLYZR: CPT

## 2017-11-06 PROCEDURE — P9016: CPT

## 2017-11-06 PROCEDURE — 85730 THROMBOPLASTIN TIME PARTIAL: CPT

## 2017-11-06 PROCEDURE — 71045 X-RAY EXAM CHEST 1 VIEW: CPT

## 2017-11-06 PROCEDURE — 86901 BLOOD TYPING SEROLOGIC RH(D): CPT

## 2017-11-06 RX ORDER — SERTRALINE 25 MG/1
25 TABLET, FILM COATED ORAL DAILY
Refills: 0 | Status: DISCONTINUED | OUTPATIENT
Start: 2017-11-06 | End: 2017-11-10

## 2017-11-06 RX ORDER — HALOPERIDOL DECANOATE 100 MG/ML
5 INJECTION INTRAMUSCULAR EVERY 6 HOURS
Refills: 0 | Status: DISCONTINUED | OUTPATIENT
Start: 2017-11-06 | End: 2017-11-10

## 2017-11-06 RX ORDER — HALOPERIDOL DECANOATE 100 MG/ML
5 INJECTION INTRAMUSCULAR ONCE
Refills: 0 | Status: DISCONTINUED | OUTPATIENT
Start: 2017-11-06 | End: 2017-11-10

## 2017-11-06 RX ORDER — QUETIAPINE FUMARATE 200 MG/1
50 TABLET, FILM COATED ORAL AT BEDTIME
Refills: 0 | Status: DISCONTINUED | OUTPATIENT
Start: 2017-11-06 | End: 2017-11-10

## 2017-11-06 RX ORDER — ACETAMINOPHEN 500 MG
2 TABLET ORAL
Qty: 0 | Refills: 0 | DISCHARGE
Start: 2017-11-06

## 2017-11-06 RX ORDER — QUETIAPINE FUMARATE 200 MG/1
50 TABLET, FILM COATED ORAL ONCE
Refills: 0 | Status: COMPLETED | OUTPATIENT
Start: 2017-11-06 | End: 2017-11-06

## 2017-11-06 RX ORDER — QUETIAPINE FUMARATE 200 MG/1
1 TABLET, FILM COATED ORAL
Qty: 0 | Refills: 0 | DISCHARGE
Start: 2017-11-06

## 2017-11-06 RX ADMIN — ENOXAPARIN SODIUM 30 MILLIGRAM(S): 100 INJECTION SUBCUTANEOUS at 20:36

## 2017-11-06 RX ADMIN — ENOXAPARIN SODIUM 30 MILLIGRAM(S): 100 INJECTION SUBCUTANEOUS at 04:10

## 2017-11-06 NOTE — DISCHARGE NOTE ADULT - CARE PROVIDER_API CALL
[facility],   [facility][note 3] [address] - [address] [place] [address]  Phone: [phone]  Fax: (   )    -

## 2017-11-06 NOTE — PROGRESS NOTE BEHAVIORAL HEALTH - NSBHADMITCOUNSEL_PSY_A_CORE
diagnostic results/impressions and/or recommended studies/risks and benefits of treatment options/instructions for management, treatment and follow up/importance of adherence to chosen treatment/risk factor reduction/client/family/caregiver education/prognosis

## 2017-11-06 NOTE — PROGRESS NOTE ADULT - SUBJECTIVE AND OBJECTIVE BOX
SUBJECTIVE: Patient seen and examined at bedside. No acute events overnight. Tolerating regular diet. Normal phonation. Drain with minimal serosanginous output; drain removed at bedside. Pt seen by psychiatry; plan to discharge to inpatient psychiatry. Pt in agreement with plan. Pt has no other complaints at this time; pt denies chest pain, shortness of breath, fever, chills, nausea, emesis, voice change, difficulty swallowing, or any other complaints.      MEDICATIONS  (STANDING):  enoxaparin Injectable 30 milliGRAM(s) SubCutaneous every 12 hours  QUEtiapine 50 milliGRAM(s) Oral at bedtime    MEDICATIONS  (PRN):  acetaminophen   Tablet. 650 milliGRAM(s) Oral every 6 hours PRN Mild Pain (1 - 3)  ondansetron Injectable 4 milliGRAM(s) IV Push every 6 hours PRN Nausea      Vital Signs Last 24 Hrs  T(C): 36.8 (06 Nov 2017 08:30), Max: 36.8 (05 Nov 2017 19:41)  T(F): 98.2 (06 Nov 2017 08:30), Max: 98.3 (05 Nov 2017 19:41)  HR: 65 (06 Nov 2017 08:30) (64 - 67)  BP: 105/67 (06 Nov 2017 08:30) (99/56 - 115/65)  BP(mean): --  RR: 19 (06 Nov 2017 08:30) (18 - 19)  SpO2: 98% (06 Nov 2017 08:30) (98% - 99%)    PE  Gen: NAD, AAOx3  Neck: 10cm linear wound well approximated with dissolvable sutures, drain removed, sterile dressing applied  Pulm: CTAB  CV: RRR  Abd: soft, nontender, nondistended  Ext: left arm 2x small lacerations covered with steristrips, C/D/I  Vasc: 2+ peripheral pulses  Neuro: GCS 15, nonfocal      I&O's Detail    05 Nov 2017 07:01  -  06 Nov 2017 07:00  --------------------------------------------------------  IN:  Total IN: 0 mL    OUT:    Bulb: 20 mL  Total OUT: 20 mL    Total NET: -20 mL          LABS:                        10.1   6.1   )-----------( 148      ( 06 Nov 2017 07:11 )             28.8     11-06    145  |  109<H>  |  9.0  ----------------------------<  97  3.8   |  27.0  |  0.75    Ca    8.4<L>      06 Nov 2017 07:11  Phos  2.9     11-06  Mg     2.3     11-06

## 2017-11-06 NOTE — PROGRESS NOTE ADULT - ATTENDING COMMENTS
Agree with above.  The patient is without pain in the throat, complains of a dry throat.  No overt pain, states he is overall comfortable.  The neck incision site is clean with no signs of hematoma, no redness or signs of infection.  The DAKOTAH drain is with a small amount of serosanguineous output.  The left arm laceration sites are clean with no swelling or bleeding.  The patient is to have a barium swallow, if negative then will consider PO.  Likely can remove drain over the next 24 to 36 hours.
Patient seen and examined, DAKOTAH removed.  Ok to be d/c to phychiatric.

## 2017-11-06 NOTE — PROGRESS NOTE BEHAVIORAL HEALTH - NSBHCHARTREVIEWVS_PSY_A_CORE FT
Vital Signs Last 24 Hrs  T(C): 36.8 (06 Nov 2017 08:30), Max: 36.8 (05 Nov 2017 19:41)  T(F): 98.2 (06 Nov 2017 08:30), Max: 98.3 (05 Nov 2017 19:41)  HR: 65 (06 Nov 2017 08:30) (64 - 67)  BP: 105/67 (06 Nov 2017 08:30) (99/56 - 115/65)  BP(mean): --  RR: 19 (06 Nov 2017 08:30) (18 - 19)  SpO2: 98% (06 Nov 2017 08:30) (98% - 99%)

## 2017-11-06 NOTE — DISCHARGE NOTE ADULT - MEDICATION SUMMARY - MEDICATIONS TO TAKE
I will START or STAY ON the medications listed below when I get home from the hospital:    acetaminophen 325 mg oral tablet  -- 2 tab(s) by mouth every 6 hours, As needed, Mild Pain (1 - 3)  -- Indication: For Pain    QUEtiapine 50 mg oral tablet  -- 1 tab(s) by mouth once a day (at bedtime)  -- Indication: For Bipolar 1 disorder
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
## 2017-11-06 NOTE — DISCHARGE NOTE ADULT - PROVIDER TOKENS
FREE:[LAST:[[facility]],PHONE:[phone]],FAX:[phone]   [phone]    -],ADDRESS:[[address] [place] [address] - [address] [place] [address]]]

## 2017-11-06 NOTE — DISCHARGE NOTE ADULT - CARE PLAN
Principal Discharge DX:	Laceration of neck, complicated, initial encounter  Goal:	Alleviation of pain and symptoms  Instructions for follow-up, activity and diet:	Follow up: Please call and make an appointment with the [facility] 10-14 days after discharge. Also, please call and make an appointment with your primary care physician as per your usual schedule.   Activity: May return to normal activities as tolerated, however refrain from heavy lifting and/or straining.  Diet: May continue regular diet.  Medications: Please take all home medications as prescribed by your primary care doctor. You may take over-the-counter tylenol for pain relief, as needed.  Wound Care: Please, keep wound site clean and dry. You may shower, but do not bathe.  Patient is advised to RETURN TO THE EMERGENCY DEPARTMENT for any of the following - worsening pain, fever/chills, nausea/vomiting, altered mental status, chest pain, shortness of breath, or any other new / worsening symptom.  Secondary Diagnosis:	Bipolar 1 disorder  Instructions for follow-up, activity and diet:	You are being discharged to an inpatient psychiatric facility. The psychiatrist who saw you while in the hospital has started you on a medication called SEROQUEL - please take as prescribed and adhere to remaining recommendations regarding psychiatric medications and follow-up as per your providers at the psychiatric facility. Principal Discharge DX:	Laceration of neck, complicated, initial encounter  Goal:	Alleviation of pain and symptoms  Instructions for follow-up, activity and diet:	Follow up: Please call and make an appointment with the [facility] 10-14 days after discharge. Also, please call and make an appointment with your primary care physician as per your usual schedule.   Activity: May return to normal activities as tolerated, however refrain from heavy lifting and/or straining.  Diet: May continue regular diet.  Medications: Please take all home medications as prescribed by your primary care doctor. You may take over-the-counter tylenol for pain relief, as needed.  Wound Care: Please, keep wound site clean and dry. You may shower, but do not bathe.  Patient is advised to RETURN TO THE EMERGENCY DEPARTMENT for any of the following - worsening pain, fever/chills, nausea/vomiting, altered mental status, chest pain, shortness of breath, or any other new / worsening symptom.  Secondary Diagnosis:	Bipolar 1 disorder  Instructions for follow-up, activity and diet:	You are being discharged to an inpatient psychiatric facility. The psychiatrist who saw you while in the hospital has started you on a medication called SEROQUEL - please take as prescribed and adhere to remaining recommendations regarding psychiatric medications and follow-up as per your providers at the psychiatric facility..

## 2017-11-06 NOTE — PROGRESS NOTE ADULT - SUBJECTIVE AND OBJECTIVE BOX
no issue overnight  ICU Vital Signs Last 24 Hrs  T(C): 36.8 (06 Nov 2017 08:30), Max: 36.8 (05 Nov 2017 19:41)  T(F): 98.2 (06 Nov 2017 08:30), Max: 98.3 (05 Nov 2017 19:41)  HR: 65 (06 Nov 2017 08:30) (64 - 67)  BP: 105/67 (06 Nov 2017 08:30) (99/56 - 115/65)  BP(mean): --  ABP: --  ABP(mean): --  RR: 19 (06 Nov 2017 08:30) (18 - 19)  SpO2: 98% (06 Nov 2017 08:30) (98% - 99%)    MEDICATIONS  (STANDING):  enoxaparin Injectable 30 milliGRAM(s) SubCutaneous every 12 hours  QUEtiapine 50 milliGRAM(s) Oral at bedtime    On exam, NAD no hoarseness, no dysphagia.  incsion right madibular are c/d/i. DAKOTAH with old clots and serous.  DAKOTAH removed.

## 2017-11-06 NOTE — PROGRESS NOTE ADULT - NSHPATTENDINGPLANDISCUSS_GEN_ALL_CORE
Patient, Family, Nirsing staff. all questions answered.
Patient, Family, [facility] team. all questions answered.

## 2017-11-06 NOTE — PROGRESS NOTE BEHAVIORAL HEALTH - NSBHFUPINTERVALHXFT_PSY_A_CORE
Patient recounted events that led to suicide attempt.  He reported that he has been stressed for two months with mounting financial pressure and felt his family would be better off with him dead.  He did not disclose preparation for suicide attempt to family and went to remote location to carry out plan to kill himself. He admits to depressive sx's for two month and deliberate suicidal plan.  He admits to feeling guilty about what he does.  Continues to endorse depressed mood, poor appetite, poor energy, poor concentration. Denying current S/H I/I/P. He reports he slept well last night with Seroquel.  Screened for manic sx's. No clear episodes of erick were elicited from patient or ex wife.  He does report that exercise and action is his usual method of coping but that it was not resolving the problem this time. 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   Treatment options were discussed with patient and ex wife who was present.  Would start Sertraline 25 mg daily.  Risks, benefits and common side effects were discussed with patient.   Patient willing to go voluntarily to hospital. Psychoeducation and counseling was provided. Coordinated care with .

## 2017-11-06 NOTE — DISCHARGE NOTE ADULT - PLAN OF CARE
Alleviation of pain and symptoms Follow up: Please call and make an appointment with the [facility] 10-14 days after discharge. Also, please call and make an appointment with your primary care physician as per your usual schedule.   Activity: May return to normal activities as tolerated, however refrain from heavy lifting and/or straining.  Diet: May continue regular diet.  Medications: Please take all home medications as prescribed by your primary care doctor. You may take over-the-counter tylenol for pain relief, as needed.  Wound Care: Please, keep wound site clean and dry. You may shower, but do not bathe.  Patient is advised to RETURN TO THE EMERGENCY DEPARTMENT for any of the following - worsening pain, fever/chills, nausea/vomiting, altered mental status, chest pain, shortness of breath, or any other new / worsening symptom. You are being discharged to an inpatient psychiatric facility. The psychiatrist who saw you while in the hospital has started you on a medication called SEROQUEL - please take as prescribed and adhere to remaining recommendations regarding psychiatric medications and follow-up as per your providers at the psychiatric facility. You are being discharged to an inpatient psychiatric facility. The psychiatrist who saw you while in the hospital has started you on a medication called SEROQUEL - please take as prescribed and adhere to remaining recommendations regarding psychiatric medications and follow-up as per your providers at the psychiatric facility..

## 2017-11-06 NOTE — PROGRESS NOTE ADULT - PROBLEM SELECTOR PLAN 2
-drain removed  -cover with sterile dressing  -remove dressing 24hours  -shower normally  -follow up [facility] 1 week
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
## 2017-11-07 PROCEDURE — 99223 1ST HOSP IP/OBS HIGH 75: CPT

## 2017-11-07 PROCEDURE — 99233 SBSQ HOSP IP/OBS HIGH 50: CPT

## 2017-11-07 RX ADMIN — QUETIAPINE FUMARATE 50 MILLIGRAM(S): 200 TABLET, FILM COATED ORAL at 00:09

## 2017-11-07 RX ADMIN — QUETIAPINE FUMARATE 50 MILLIGRAM(S): 200 TABLET, FILM COATED ORAL at 20:59

## 2017-11-07 RX ADMIN — SERTRALINE 25 MILLIGRAM(S): 25 TABLET, FILM COATED ORAL at 09:13
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
## 2017-11-07 NOTE — CONSULT NOTE ADULT - ASSESSMENT
57M admitted for depression transferred from trauma service at [facility] for treatment of neck and arm wounds    Plan:  Neck and arms lacerations: Dry gauze to neck wound. Patient to f/u at [facility] surgery clinic in 10-14 days [address] [facility] [phone].  No sutures to be removed. Left arm wound: leave steristrips in place until the fall off spontaneously.  Pt believes the sutures in arm are also absorbabale.  Assess 11/15/17 for presence of sutures and remove if pt is still admitted.  If discharged f/u at [facility] surgery clinic.    Depression/SA: management per primary team.

## 2017-11-08 PROCEDURE — 99232 SBSQ HOSP IP/OBS MODERATE 35: CPT

## 2017-11-08 RX ORDER — BACITRACIN ZINC 500 UNIT/G
1 OINTMENT IN PACKET (EA) TOPICAL
Qty: 1 | Refills: 0
Start: 2017-11-08 | End: 2017-11-18

## 2017-11-08 RX ORDER — BACITRACIN ZINC 500 UNIT/G
1 OINTMENT IN PACKET (EA) TOPICAL DAILY
Refills: 0 | Status: DISCONTINUED | OUTPATIENT
Start: 2017-11-08 | End: 2017-11-10

## 2017-11-08 RX ORDER — SERTRALINE 25 MG/1
1 TABLET, FILM COATED ORAL
Qty: 30 | Refills: 0
Start: 2017-11-08 | End: 2017-12-08

## 2017-11-08 RX ORDER — QUETIAPINE FUMARATE 200 MG/1
1 TABLET, FILM COATED ORAL
Qty: 30 | Refills: 0
Start: 2017-11-08 | End: 2017-12-08

## 2017-11-08 RX ADMIN — SERTRALINE 25 MILLIGRAM(S): 25 TABLET, FILM COATED ORAL at 09:01

## 2017-11-08 RX ADMIN — Medication 1 APPLICATION(S): at 14:46

## 2017-11-08 RX ADMIN — QUETIAPINE FUMARATE 50 MILLIGRAM(S): 200 TABLET, FILM COATED ORAL at 21:36

## 2017-11-09 PROCEDURE — 99232 SBSQ HOSP IP/OBS MODERATE 35: CPT

## 2017-11-09 RX ADMIN — Medication 1 APPLICATION(S): at 09:50

## 2017-11-09 RX ADMIN — QUETIAPINE FUMARATE 50 MILLIGRAM(S): 200 TABLET, FILM COATED ORAL at 21:12

## 2017-11-09 RX ADMIN — SERTRALINE 25 MILLIGRAM(S): 25 TABLET, FILM COATED ORAL at 09:51

## 2017-11-10 VITALS — TEMPERATURE: 98 F | DIASTOLIC BLOOD PRESSURE: 74 MMHG | SYSTOLIC BLOOD PRESSURE: 112 MMHG | HEART RATE: 75 BPM

## 2017-11-10 PROCEDURE — 99238 HOSP IP/OBS DSCHRG MGMT 30/<: CPT

## 2017-11-10 RX ADMIN — SERTRALINE 25 MILLIGRAM(S): 25 TABLET, FILM COATED ORAL at 08:25

## 2017-11-10 RX ADMIN — Medication 1 APPLICATION(S): at 08:24

## 2017-11-13 ENCOUNTER — OUTPATIENT (OUTPATIENT)
Dept: OUTPATIENT SERVICES | Facility: HOSPITAL | Age: 57
LOS: 1 days | Discharge: HOME | End: 2017-11-13

## 2017-11-22 DIAGNOSIS — F32.9 MAJOR DEPRESSIVE DISORDER, SINGLE EPISODE, UNSPECIFIED: ICD-10-CM

## 2017-12-06 ENCOUNTER — OUTPATIENT (OUTPATIENT)
Dept: OUTPATIENT SERVICES | Facility: HOSPITAL | Age: 57
LOS: 1 days | Discharge: HOME | End: 2017-12-06
Payer: COMMERCIAL

## 2017-12-06 PROCEDURE — 90792 PSYCH DIAG EVAL W/MED SRVCS: CPT

## 2017-12-07 DIAGNOSIS — F32.9 MAJOR DEPRESSIVE DISORDER, SINGLE EPISODE, UNSPECIFIED: ICD-10-CM

## 2018-01-31 PROCEDURE — 99213 OFFICE O/P EST LOW 20 MIN: CPT

## 2018-05-16 PROCEDURE — 99213 OFFICE O/P EST LOW 20 MIN: CPT

## 2024-02-26 NOTE — BEHAVIORAL HEALTH ASSESSMENT NOTE - DIFFERENTIAL
Stable  Patient recently seen by eye doctor, will obtain records   Mood D/O NOS  R/O Bipolar D/O-NOS
